# Patient Record
Sex: MALE | Race: WHITE | NOT HISPANIC OR LATINO | Employment: OTHER | URBAN - METROPOLITAN AREA
[De-identification: names, ages, dates, MRNs, and addresses within clinical notes are randomized per-mention and may not be internally consistent; named-entity substitution may affect disease eponyms.]

---

## 2017-01-06 ENCOUNTER — OFFICE VISIT (OUTPATIENT)
Dept: URGENT CARE | Facility: URGENT CARE | Age: 47
End: 2017-01-06
Payer: MEDICARE

## 2017-01-06 VITALS
DIASTOLIC BLOOD PRESSURE: 64 MMHG | TEMPERATURE: 98.3 F | HEART RATE: 80 BPM | RESPIRATION RATE: 14 BRPM | OXYGEN SATURATION: 97 % | SYSTOLIC BLOOD PRESSURE: 98 MMHG

## 2017-01-06 DIAGNOSIS — L03.031 PARONYCHIA OF SECOND TOE, RIGHT: Primary | ICD-10-CM

## 2017-01-06 PROCEDURE — 99213 OFFICE O/P EST LOW 20 MIN: CPT | Performed by: FAMILY MEDICINE

## 2017-01-06 RX ORDER — CEPHALEXIN 500 MG/1
500 CAPSULE ORAL 2 TIMES DAILY
Qty: 20 CAPSULE | Refills: 0 | Status: SHIPPED | OUTPATIENT
Start: 2017-01-06 | End: 2017-01-16

## 2017-01-06 NOTE — MR AVS SNAPSHOT
After Visit Summary   1/6/2017    Alpesh Stark    MRN: [id number]           Patient Information     Date Of Birth          1970        Visit Information        Provider Department      1/6/2017 5:15 PM Maria Way MD [facility]        Today's Diagnoses     Paronychia of second toe, right    -  1       Care Instructions      Paronychia of the Finger or Toe  Paronychia is an infection near a fingernail or toenail. It usually occurs when an opening in the cuticle or an ingrown toenail lets bacteria under the skin.  The infection will need to be drained if pus is present. If the infection has been caught early, you may need only antibiotic treatment. Healing will take about 1 to 2 weeks.  Home care  Follow these guidelines when caring for yourself at home:    Clean and soak the toe or finger. Do this twice a day for the first 3 days. To do so:    Soak your foot or hand in a tub of warm water for 5 minutes. Or hold your toe or finger under a faucet of warm running water for 5 minutes.    Clean any crust away with soap and water using a cotton swab.    Put antibiotic ointment on the infected area.    Change the dressing daily or any time it becomes soiled.    If you were given antibiotics, take them as directed until they are all gone.    If your infection is on a toe, wear comfortable shoes with a lot of toe room. You can also wear open-toed sandals while your toe heals.    You may use acetaminophen or ibuprofen to help with pain, unless another medicine was prescribed. If you have chronic liver or kidney disease, talk with your health care provider before using these medicines. Also talk with your provider if you've had a stomach ulcer or GI bleeding.  Prevention  The following can prevent paronychia:    Trim or push down the skin around the nail (cuticle).    Don't bite your nails.    Don't suck on your thumbs or fingers.  Follow-up care  Follow up with your  "health care provider, or as advised.  When to seek medical advice  Call your health care provider right away if any of these occur:    Redness, pain, or swelling of the finger or toe gets worse    Red streaks in the skin leading away from the wound    Pus or fluid drain from the nail area    Fever of 100.4 F (38 C) or higher, or as directed by your health care provider    2105-4481 [employer]. [address] [place] [address], [place] [address]. All rights reserved. This information is not intended as a substitute for professional medical care. Always follow your healthcare professional's instructions.              Follow-ups after your visit        Who to contact     If you have questions or need follow up information about today's clinic visit or your schedule please contact [facility] directly at [phone].  Normal or non-critical lab and imaging results will be communicated to you by MEDNAXhart, letter or phone within 4 business days after the clinic has received the results. If you do not hear from us within 7 days, please contact the clinic through MEDNAXhart or phone. If you have a critical or abnormal lab result, we will notify you by phone as soon as possible.  Submit refill requests through CarePayment or call your pharmacy and they will forward the refill request to us. Please allow 3 business days for your refill to be completed.          Additional Information About Your Visit        CarePayment Information     CarePayment lets you send messages to your doctor, view your test results, renew your prescriptions, schedule appointments and more. To sign up, go to [email or web address] . Click on \"Log in\" on the left side of the screen, which will take you to the Welcome page. Then click on \"Sign up Now\" on the right side of the page.     You will be asked to enter the access code listed below, as well as some personal information. Please follow the directions to create your username and " password.     Your access code is: [email or web address]  Expires: 2017  5:58 PM     Your access code will  in 90 days. If you need help or a new code, please call your [facility] clinic or [phone].        Care EveryWhere ID     This is your Care EveryWhere ID. This could be used by other organizations to access your [facility] medical records  [id number]        Your Vitals Were     Pulse Temperature Respirations Pulse Oximetry          80 98.3  F (36.8  C) (Oral) 14 97%         Blood Pressure from Last 3 Encounters:   17 98/64   16 112/72    Weight from Last 3 Encounters:   16 168 lb 12.8 oz (76.567 kg)   12 182 lb (82.555 kg)              Today, you had the following     No orders found for display         Today's Medication Changes          These changes are accurate as of: 17  5:58 PM.  If you have any questions, ask your nurse or doctor.               Start taking these medicines.        Dose/Directions    cephALEXin 500 MG capsule   Commonly known as:  KEFLEX   Used for:  Paronychia of second toe, right   Started by:  Maria Way MD        Dose:  500 mg   Take 1 capsule (500 mg) by mouth 2 times daily for 10 days   Quantity:  20 capsule   Refills:  0            Where to get your medicines      These medications were sent to [facility] - [place], [place] - [address] [facility] [address]     Phone:  [phone]    - cephALEXin 500 MG capsule             Primary Care Provider Office Phone # Fax #    Sandeep Aguayo [phone]       [facility] [address]  [facility] [address]        Thank you!     Thank you for choosing [facility]  for your care. Our goal is always to provide you with excellent care. Hearing back from our patients is one way we can continue to improve our services. Please take a few minutes to complete the written survey that you may  receive in the mail after your visit with us. Thank you!             Your Updated Medication List - Protect others around you: Learn how to safely use, store and throw away your medicines at www.disposemymeds.org.          This list is accurate as of: 1/6/17  5:58 PM.  Always use your most recent med list.                   Brand Name Dispense Instructions for use    calcium carbonate 500 MG tablet    OS-JOSÉ 500 mg [place]. [place]     Take 500 mg by mouth 2 times daily       carBAMazepine 200 MG tablet    TEGretol     Take 200 mg by mouth 3 times daily.       cephALEXin 500 MG capsule    KEFLEX    20 capsule    Take 1 capsule (500 mg) by mouth 2 times daily for 10 days       cloNIDine 0.1 MG/24HR WK patch    CATAPRES-TTS1     Place 1 patch onto the skin once a week       ferrous sulfate 325 (65 FE) MG tablet    IRON     Take by mouth daily (with breakfast)       LAMOTRIGINE PO          MIRALAX PO      Take  by mouth. 1 capful qd prn       omeprazole 20 MG CR capsule    priLOSEC     Take 20 mg by mouth 2 times daily.       omeprazole-sodium bicarbonate  MG per capsule    ZEGERID     Take 1 capsule by mouth every morning (before breakfast)       * risperDAL 0.25 MG tablet   Generic drug:  risperiDONE      Take 0.25 mg by mouth daily as needed.       * risperDAL 0.25 MG tablet   Generic drug:  risperiDONE      Take 0.75 mg by mouth 2 times daily.       vitamin C 500 MG/5ML syrup    ASCORBIC ACID     Take 500 mg by mouth daily       VITAMIN D3 PO      Take 2,000 Units by mouth daily       ZOLOFT 100 MG tablet   Generic drug:  sertraline      Take 100 mg by mouth daily.       ZONEGRAN 100 MG capsule   Generic drug:  zonisamide      Take 100 mg by mouth At Bedtime.       ZYRTEC 10 MG tablet   Generic drug:  cetirizine      Take 10 mg by mouth daily.       * Notice:  This list has 2 medication(s) that are the same as other medications prescribed for you. Read the directions carefully, and ask your doctor or other care  provider to review them with you.

## 2017-01-06 NOTE — NURSING NOTE
"Chief Complaint   Patient presents with     Urgent Care     Derm Problem     tore off hangnail on left 2nd toe 2 days ago, now infected       Initial BP 98/64 mmHg  Pulse 80  Temp(Src) 98.3  F (36.8  C) (Oral)  Resp 14  SpO2 97% Estimated body mass index is 26.43 kg/(m^2) as calculated from the following:    Height as of 11/1/16: 5' 7\" (1.702 m).    Weight as of 11/1/16: 168 lb 12.8 oz (76.567 kg).  BP completed using cuff size: regular  "

## 2017-01-06 NOTE — PATIENT INSTRUCTIONS
Paronychia of the Finger or Toe  Paronychia is an infection near a fingernail or toenail. It usually occurs when an opening in the cuticle or an ingrown toenail lets bacteria under the skin.  The infection will need to be drained if pus is present. If the infection has been caught early, you may need only antibiotic treatment. Healing will take about 1 to 2 weeks.  Home care  Follow these guidelines when caring for yourself at home:    Clean and soak the toe or finger. Do this twice a day for the first 3 days. To do so:    Soak your foot or hand in a tub of warm water for 5 minutes. Or hold your toe or finger under a faucet of warm running water for 5 minutes.    Clean any crust away with soap and water using a cotton swab.    Put antibiotic ointment on the infected area.    Change the dressing daily or any time it becomes soiled.    If you were given antibiotics, take them as directed until they are all gone.    If your infection is on a toe, wear comfortable shoes with a lot of toe room. You can also wear open-toed sandals while your toe heals.    You may use acetaminophen or ibuprofen to help with pain, unless another medicine was prescribed. If you have chronic liver or kidney disease, talk with your health care provider before using these medicines. Also talk with your provider if you've had a stomach ulcer or GI bleeding.  Prevention  The following can prevent paronychia:    Trim or push down the skin around the nail (cuticle).    Don't bite your nails.    Don't suck on your thumbs or fingers.  Follow-up care  Follow up with your health care provider, or as advised.  When to seek medical advice  Call your health care provider right away if any of these occur:    Redness, pain, or swelling of the finger or toe gets worse    Red streaks in the skin leading away from the wound    Pus or fluid drain from the nail area    Fever of 100.4 F (38 C) or higher, or as directed by your health care provider    1561-7192  [employer]. [address] [place] [address], [place] [address]. All rights reserved. This information is not intended as a substitute for professional medical care. Always follow your healthcare professional's instructions.

## 2017-01-07 NOTE — PROGRESS NOTES
SUBJECTIVE:  Alpesh Stark is a 46 year old male who c/o right 2nd toe infection after pulled at a hangnail there 2 days ago.  No pus discharge.     OBJECTIVE:  Vital signs as noted above.  Appearance: in no apparent distress.  Right 2nd toe exam: soft tissue swelling, redness and tenderness at the distal medial nail fold.  No fluctuance.  No pus discharge.     ASSESSMENT:  Right Second Toe Paronychia    PLAN:  Keflex as per orders.  Foot soaks BID.  RTC if not improving as anticipated.   See orders in [facility].  Maria Garrido MD

## 2018-05-24 ENCOUNTER — OFFICE VISIT (OUTPATIENT)
Dept: URGENT CARE | Facility: URGENT CARE | Age: 48
End: 2018-05-24
Payer: COMMERCIAL

## 2018-05-24 VITALS
HEART RATE: 74 BPM | BODY MASS INDEX: 26.94 KG/M2 | TEMPERATURE: 96.8 F | SYSTOLIC BLOOD PRESSURE: 120 MMHG | DIASTOLIC BLOOD PRESSURE: 75 MMHG | OXYGEN SATURATION: 98 % | WEIGHT: 172 LBS

## 2018-05-24 DIAGNOSIS — S05.12XA ECCHYMOSIS OF LEFT EYE: Primary | ICD-10-CM

## 2018-05-24 PROCEDURE — 99213 OFFICE O/P EST LOW 20 MIN: CPT | Performed by: STUDENT IN AN ORGANIZED HEALTH CARE EDUCATION/TRAINING PROGRAM

## 2018-05-24 NOTE — PROGRESS NOTES
Subjective:   Alpesh Stark is a 47 year old male who presents for   Chief Complaint   Patient presents with     Urgent Care     Pt in clinic to have eval for left eye redness and swelling.     Eye Problem     Patient is here with 1 of his caretakers from his group home for evaluation of left upper eyelid redness.  They wanted him to be seen as he has had a history in the recent past of left eye irritation that was initially prescribed with antibiotics as well as eyedrops.  He reports that he got better, but approximately 2 weeks after being treated for this he was then seen again and prescribed an antifungal cream.  Since that time everything has improved.  Yesterday they noticed that his upper eyelid was more bruised in appearance.  The patient himself says that he hit himself in the left eye with a broom when he was doing some chores yesterday.  He reports that it is painful.  No drainage from that eye.  No increased hearing, redness of the sclera, or headaches.  He has not reported any problems with his vision.  He does not appear to have any difficulties per staff with IV movements.  No other bruises noted on his face or head.  He has been acting his normal self today.    PMHX/PSHX/MEDS/ALLERGIES/SHX/FHX reviewed and updated in Epic.    There are no active problems to display for this patient.    Current Outpatient Prescriptions   Medication     calcium carbonate (OS-JOSÉ 500 MG [place]. [place]) 500 MG tablet     carbamazepine (TEGRETOL) 200 MG tablet     cetirizine (ZYRTEC) 10 MG tablet     Cholecalciferol (VITAMIN D3 PO)     cloNIDine (CATAPRES-TTS1) 0.1 MG/24HR patch     ferrous sulfate (IRON) 325 (65 FE) MG tablet     LAMOTRIGINE PO     omeprazole (PRILOSEC) 20 MG capsule     omeprazole-sodium bicarbonate (ZEGERID)  MG per capsule     Polyethylene Glycol 3350 (MIRALAX PO)     risperidone (RISPERDAL) 0.25 MG tablet     risperidone (RISPERDAL) 0.25 MG tablet     sertraline (ZOLOFT) 100 MG tablet     vitamin C  (ASCORBIC ACID) 500 MG/5ML syrup     zonisamide (ZONEGRAN) 100 MG capsule     No current facility-administered medications for this visit.      ROS:  As above per HPI    Objective:   /75  Pulse 74  Temp 96.8  F (36  C) (Tympanic)  Wt 172 lb (78 kg)  SpO2 98%  BMI 26.94 kg/m2, Body mass index is 26.94 kg/(m^2).  Gen:  NAD, well-nourished, sitting in chair comfortably  HEENT: EOMI and not painful, PERRL sclera anicteric and not injected, ecchymosis over the entire upper left eyelid.  No laceration or skin breakdown.  Able to fully open and close eyes on command.  Head normocephalic; nares patent; oropharynx pink and moist w/ tonsils 2+  Neck: trachea midline, supple without adenopathy    Assessment & Plan:   Alpesh Stark, 47 year old male who presents with:  Alpesh was seen today for urgent care and eye problem.    Diagnoses and all orders for this visit:    Ecchymosis of left eye    Patient likely had an acute traumatic event with the broom yesterday causing bruising of his left upper eyelid.  There does not appear to be any problems with his eyesight or extraocular motion.  The skin does not appear to be hyperkeratotic or irritated suggesting recurrent infections as he has been having in the past.  Discussed that they could place ice on his thigh if he is complaining of pain for approximately 5-10 minutes at a time.  He could use Tylenol as needed for pain if it is very bothersome for him.  Bruising should resolve in the next 1-2 weeks.  Discussed warning signs and symptoms for them to return for further evaluation.    Iesha Singh DO PGY2  [facility] [place]    Options for treatment and/or follow-up care were reviewed with the patient. Alpesh Stark and/or legal guardian was engaged and actively involved in the decision making process. Patient/guardian verbalized understanding of the options discussed and was satisfied with the final plan.

## 2018-05-24 NOTE — MR AVS SNAPSHOT
After Visit Summary   5/24/2018    Alpesh Stark    MRN: [id number]           Patient Information     Date Of Birth          1970        Visit Information        Provider Department      5/24/2018 5:45 PM Iesha Singh, DO [facility]        Today's Diagnoses     Ecchymosis of left eye    -  1       Follow-ups after your visit        Who to contact     If you have questions or need follow up information about today's clinic visit or your schedule please contact [facility] directly at [phone].  Normal or non-critical lab and imaging results will be communicated to you by MyChart, letter or phone within 4 business days after the clinic has received the results. If you do not hear from us within 7 days, please contact the clinic through MyChart or phone. If you have a critical or abnormal lab result, we will notify you by phone as soon as possible.  Submit refill requests through [employer] or call your pharmacy and they will forward the refill request to us. Please allow 3 business days for your refill to be completed.          Additional Information About Your Visit        Care EveryWhere ID     This is your Care EveryWhere ID. This could be used by other organizations to access your [facility] medical records  [id number]        Your Vitals Were     Pulse Temperature Pulse Oximetry BMI (Body Mass Index)          74 96.8  F (36  C) (Tympanic) 98% 26.94 kg/m2         Blood Pressure from Last 3 Encounters:   05/24/18 120/75   01/06/17 98/64   11/01/16 112/72    Weight from Last 3 Encounters:   05/24/18 172 lb (78 kg)   11/01/16 168 lb 12.8 oz (76.6 kg)   02/17/12 182 lb (82.6 kg)              Today, you had the following     No orders found for display       Primary Care Provider Office Phone # Fax #    Sandeep Aguayo [phone]       [facility] [address] [facility] [address]        Equal Access to  Services     [facility]: Hadii aad ku hadkeerthiluzma Yulyholley, wasegundoda luqadaha, qaybta kaalmada maria luisa, william resendez . So [facility] [phone].    ATENCIÓN: Si chanola malini, tiene a casey disposición servicios gratuitos de asistencia lingüística. Llame al [phone].    We comply with applicable federal civil rights laws and [place] laws. We do not discriminate on the basis of race, color, national origin, age, disability, sex, sexual orientation, or gender identity.            Thank you!     Thank you for choosing [facility]  for your care. Our goal is always to provide you with excellent care. Hearing back from our patients is one way we can continue to improve our services. Please take a few minutes to complete the written survey that you may receive in the mail after your visit with us. Thank you!             Your Updated Medication List - Protect others around you: Learn how to safely use, store and throw away your medicines at www.disposemymeds.org.          This list is accurate as of 5/24/18  9:52 PM.  Always use your most recent med list.                   Brand Name Dispense Instructions for use Diagnosis    calcium carbonate 500 MG tablet    OS-JOSÉ 500 mg [place]. [place]     Take 500 mg by mouth 2 times daily        carBAMazepine 200 MG tablet    TEGretol     Take 200 mg by mouth 3 times daily.        cloNIDine 0.1 MG/24HR WK patch    CATAPRES-TTS1     Place 1 patch onto the skin once a week        ferrous sulfate 325 (65 Fe) MG tablet    IRON     Take by mouth daily (with breakfast)        LAMOTRIGINE PO           MIRALAX PO      Take  by mouth. 1 capful qd prn        omeprazole 20 MG CR capsule    priLOSEC     Take 20 mg by mouth 2 times daily.        omeprazole-sodium bicarbonate  MG per capsule    ZEGERID     Take 1 capsule by mouth every morning (before breakfast)        * risperDAL 0.25 MG tablet   Generic drug:  risperiDONE      Take 0.25 mg by  mouth daily as needed.        * risperDAL 0.25 MG tablet   Generic drug:  risperiDONE      Take 0.75 mg by mouth 2 times daily.        vitamin C 500 MG/5ML syrup    ASCORBIC ACID     Take 500 mg by mouth daily        VITAMIN D3 PO      Take 2,000 Units by mouth daily        ZOLOFT 100 MG tablet   Generic drug:  sertraline      Take 100 mg by mouth daily.        ZONEGRAN 100 MG capsule   Generic drug:  zonisamide      Take 100 mg by mouth At Bedtime.        ZYRTEC 10 MG tablet   Generic drug:  cetirizine      Take 10 mg by mouth daily.        * Notice:  This list has 2 medication(s) that are the same as other medications prescribed for you. Read the directions carefully, and ask your doctor or other care provider to review them with you.

## 2018-09-04 ENCOUNTER — APPOINTMENT (OUTPATIENT)
Dept: URGENT CARE | Facility: URGENT CARE | Age: 48
End: 2018-09-04
Payer: COMMERCIAL

## 2020-04-21 ENCOUNTER — COMMUNICATION - HEALTHEAST (OUTPATIENT)
Dept: SCHEDULING | Facility: CLINIC | Age: 50
End: 2020-04-21

## 2021-06-07 NOTE — TELEPHONE ENCOUNTER
Supervisor from home where Alpesh lives calling. No ALANNAH on file. Declines triage.  No information regarding Alpesh given to staff member.   States she thinks he broke his finger, needs to bring him in, however due to his disabilities is wondering about visitors in the ED.   RN talked to [facility] staff and policy is no visitors, they can talk to the front staff when they arrive but the rule is no visitors.   Radha notified. States she will talk to his guardian and will decide what to do and Radha ended call.    Diana Gallagher, RN   [facility] RN Triage    Reason for Disposition    General information question, no triage required and triager able to answer question    Protocols used: INFORMATION ONLY CALL-A-AH

## 2021-11-01 ENCOUNTER — APPOINTMENT (OUTPATIENT)
Dept: RADIOLOGY | Facility: HOSPITAL | Age: 51
End: 2021-11-01
Attending: STUDENT IN AN ORGANIZED HEALTH CARE EDUCATION/TRAINING PROGRAM
Payer: COMMERCIAL

## 2021-11-01 ENCOUNTER — HOSPITAL ENCOUNTER (EMERGENCY)
Facility: HOSPITAL | Age: 51
Discharge: HOME | End: 2021-11-01
Attending: EMERGENCY MEDICINE | Admitting: EMERGENCY MEDICINE
Payer: COMMERCIAL

## 2021-11-01 VITALS
TEMPERATURE: 97.7 F | DIASTOLIC BLOOD PRESSURE: 87 MMHG | OXYGEN SATURATION: 99 % | HEART RATE: 83 BPM | SYSTOLIC BLOOD PRESSURE: 169 MMHG | RESPIRATION RATE: 22 BRPM

## 2021-11-01 DIAGNOSIS — S60.511A HAND ABRASION, RIGHT, INITIAL ENCOUNTER: ICD-10-CM

## 2021-11-01 DIAGNOSIS — S69.91XA HAND INJURY, RIGHT, INITIAL ENCOUNTER: ICD-10-CM

## 2021-11-01 PROCEDURE — 250N000013 HC RX MED GY IP 250 OP 250 PS 637: Performed by: EMERGENCY MEDICINE

## 2021-11-01 PROCEDURE — 99284 EMERGENCY DEPT VISIT MOD MDM: CPT

## 2021-11-01 PROCEDURE — 73130 X-RAY EXAM OF HAND: CPT | Mod: RT

## 2021-11-01 PROCEDURE — 73110 X-RAY EXAM OF WRIST: CPT | Mod: RT

## 2021-11-01 RX ORDER — ACETAMINOPHEN 325 MG/1
975 TABLET ORAL ONCE
Status: COMPLETED | OUTPATIENT
Start: 2021-11-01 | End: 2021-11-01

## 2021-11-01 RX ADMIN — ACETAMINOPHEN 975 MG: 325 TABLET ORAL at 10:46

## 2021-11-01 ASSESSMENT — ENCOUNTER SYMPTOMS
JOINT SWELLING: 0
NAUSEA: 0
ACTIVITY CHANGE: 0
CONFUSION: 0
VOMITING: 0
SHORTNESS OF BREATH: 0
ABDOMINAL PAIN: 0
DIZZINESS: 0

## 2021-11-01 NOTE — ED TRIAGE NOTES
Developmentally Disabled male with group home staff arrives to ED after punching a mirror with his right upper extremity this am; pt winces in pain when he moves his right wrist and hand.

## 2021-11-01 NOTE — DISCHARGE INSTRUCTIONS
Continue to clean wounds with antibacterial soap and water.  Follow up with a primary clinic for re-evaluation of wounds.  Return for signs of infection - increasing redness/swelling, especially 2-3 days from now.

## 2022-05-11 ENCOUNTER — APPOINTMENT (OUTPATIENT)
Dept: CT IMAGING | Facility: CLINIC | Age: 52
End: 2022-05-11
Attending: EMERGENCY MEDICINE
Payer: COMMERCIAL

## 2022-05-11 ENCOUNTER — HOSPITAL ENCOUNTER (EMERGENCY)
Facility: CLINIC | Age: 52
Discharge: HOME | End: 2022-05-11
Attending: EMERGENCY MEDICINE | Admitting: EMERGENCY MEDICINE
Payer: COMMERCIAL

## 2022-05-11 VITALS
OXYGEN SATURATION: 99 % | BODY MASS INDEX: 26.07 KG/M2 | HEIGHT: 68 IN | RESPIRATION RATE: 23 BRPM | HEART RATE: 78 BPM | DIASTOLIC BLOOD PRESSURE: 94 MMHG | SYSTOLIC BLOOD PRESSURE: 147 MMHG | TEMPERATURE: 98.2 F | WEIGHT: 172 LBS

## 2022-05-11 DIAGNOSIS — R42 DIZZINESS: ICD-10-CM

## 2022-05-11 DIAGNOSIS — E83.42 HYPOMAGNESEMIA: ICD-10-CM

## 2022-05-11 DIAGNOSIS — E87.1 HYPONATREMIA: ICD-10-CM

## 2022-05-11 LAB
ALBUMIN SERPL-MCNC: 4.5 G/DL (ref 3.5–5)
ALP SERPL-CCNC: 103 U/L (ref 45–120)
ALT SERPL W P-5'-P-CCNC: 24 U/L (ref 0–45)
ANION GAP SERPL CALCULATED.3IONS-SCNC: 10 MMOL/L (ref 5–18)
AST SERPL W P-5'-P-CCNC: 27 U/L (ref 0–40)
ATRIAL RATE - MUSE: 71 BPM
ATRIAL RATE - MUSE: 77 BPM
BASOPHILS # BLD AUTO: 0 10E3/UL (ref 0–0.2)
BASOPHILS NFR BLD AUTO: 1 %
BILIRUB SERPL-MCNC: 0.4 MG/DL (ref 0–1)
BUN SERPL-MCNC: 9 MG/DL (ref 8–22)
CALCIUM SERPL-MCNC: 9.7 MG/DL (ref 8.5–10.5)
CHLORIDE BLD-SCNC: 89 MMOL/L (ref 98–107)
CO2 SERPL-SCNC: 27 MMOL/L (ref 22–31)
CREAT SERPL-MCNC: 0.76 MG/DL (ref 0.7–1.3)
DIASTOLIC BLOOD PRESSURE - MUSE: NORMAL MMHG
DIASTOLIC BLOOD PRESSURE - MUSE: NORMAL MMHG
EOSINOPHIL # BLD AUTO: 0 10E3/UL (ref 0–0.7)
EOSINOPHIL NFR BLD AUTO: 0 %
ERYTHROCYTE [DISTWIDTH] IN BLOOD BY AUTOMATED COUNT: 11.9 % (ref 10–15)
GFR SERPL CREATININE-BSD FRML MDRD: >90 ML/MIN/1.73M2
GLUCOSE BLD-MCNC: 98 MG/DL (ref 70–125)
HCT VFR BLD AUTO: 38.2 % (ref 40–53)
HGB BLD-MCNC: 13.7 G/DL (ref 13.3–17.7)
IMM GRANULOCYTES # BLD: 0 10E3/UL
IMM GRANULOCYTES NFR BLD: 0 %
INTERPRETATION ECG - MUSE: NORMAL
INTERPRETATION ECG - MUSE: NORMAL
LYMPHOCYTES # BLD AUTO: 1 10E3/UL (ref 0.8–5.3)
LYMPHOCYTES NFR BLD AUTO: 27 %
MAGNESIUM SERPL-MCNC: 1.7 MG/DL (ref 1.8–2.6)
MCH RBC QN AUTO: 32.2 PG (ref 26.5–33)
MCHC RBC AUTO-ENTMCNC: 35.9 G/DL (ref 31.5–36.5)
MCV RBC AUTO: 90 FL (ref 78–100)
MONOCYTES # BLD AUTO: 0.5 10E3/UL (ref 0–1.3)
MONOCYTES NFR BLD AUTO: 13 %
NEUTROPHILS # BLD AUTO: 2.1 10E3/UL (ref 1.6–8.3)
NEUTROPHILS NFR BLD AUTO: 59 %
NRBC # BLD AUTO: 0 10E3/UL
NRBC BLD AUTO-RTO: 0 /100
P AXIS - MUSE: 74 DEGREES
P AXIS - MUSE: 82 DEGREES
PLATELET # BLD AUTO: 338 10E3/UL (ref 150–450)
POTASSIUM BLD-SCNC: 4.3 MMOL/L (ref 3.5–5)
PR INTERVAL - MUSE: 134 MS
PR INTERVAL - MUSE: 136 MS
PROT SERPL-MCNC: 8.1 G/DL (ref 6–8)
QRS DURATION - MUSE: 104 MS
QRS DURATION - MUSE: 106 MS
QT - MUSE: 378 MS
QT - MUSE: 382 MS
QTC - MUSE: 415 MS
QTC - MUSE: 427 MS
R AXIS - MUSE: 69 DEGREES
R AXIS - MUSE: 72 DEGREES
RBC # BLD AUTO: 4.26 10E6/UL (ref 4.4–5.9)
SODIUM SERPL-SCNC: 126 MMOL/L (ref 136–145)
SYSTOLIC BLOOD PRESSURE - MUSE: NORMAL MMHG
SYSTOLIC BLOOD PRESSURE - MUSE: NORMAL MMHG
T AXIS - MUSE: 68 DEGREES
T AXIS - MUSE: 78 DEGREES
TROPONIN I SERPL-MCNC: <0.01 NG/ML (ref 0–0.29)
VENTRICULAR RATE- MUSE: 71 BPM
VENTRICULAR RATE- MUSE: 77 BPM
WBC # BLD AUTO: 3.6 10E3/UL (ref 4–11)

## 2022-05-11 PROCEDURE — 83735 ASSAY OF MAGNESIUM: CPT | Performed by: EMERGENCY MEDICINE

## 2022-05-11 PROCEDURE — 93005 ELECTROCARDIOGRAM TRACING: CPT | Performed by: EMERGENCY MEDICINE

## 2022-05-11 PROCEDURE — 70498 CT ANGIOGRAPHY NECK: CPT

## 2022-05-11 PROCEDURE — 99285 EMERGENCY DEPT VISIT HI MDM: CPT | Mod: 25

## 2022-05-11 PROCEDURE — 250N000011 HC RX IP 250 OP 636: Performed by: EMERGENCY MEDICINE

## 2022-05-11 PROCEDURE — 258N000003 HC RX IP 258 OP 636: Performed by: EMERGENCY MEDICINE

## 2022-05-11 PROCEDURE — 70496 CT ANGIOGRAPHY HEAD: CPT

## 2022-05-11 PROCEDURE — 80053 COMPREHEN METABOLIC PANEL: CPT | Performed by: EMERGENCY MEDICINE

## 2022-05-11 PROCEDURE — 85025 COMPLETE CBC W/AUTO DIFF WBC: CPT | Performed by: EMERGENCY MEDICINE

## 2022-05-11 PROCEDURE — 84484 ASSAY OF TROPONIN QUANT: CPT | Performed by: EMERGENCY MEDICINE

## 2022-05-11 PROCEDURE — 36415 COLL VENOUS BLD VENIPUNCTURE: CPT | Performed by: EMERGENCY MEDICINE

## 2022-05-11 PROCEDURE — 250N000013 HC RX MED GY IP 250 OP 250 PS 637: Performed by: EMERGENCY MEDICINE

## 2022-05-11 PROCEDURE — 96360 HYDRATION IV INFUSION INIT: CPT | Mod: 59

## 2022-05-11 RX ORDER — MECLIZINE HYDROCHLORIDE 25 MG/1
25 TABLET ORAL ONCE
Status: COMPLETED | OUTPATIENT
Start: 2022-05-11 | End: 2022-05-11

## 2022-05-11 RX ORDER — IOPAMIDOL 755 MG/ML
100 INJECTION, SOLUTION INTRAVASCULAR ONCE
Status: COMPLETED | OUTPATIENT
Start: 2022-05-11 | End: 2022-05-11

## 2022-05-11 RX ORDER — MAGNESIUM OXIDE 400 MG/1
400 TABLET ORAL ONCE
Status: COMPLETED | OUTPATIENT
Start: 2022-05-11 | End: 2022-05-11

## 2022-05-11 RX ORDER — SODIUM CHLORIDE 9 MG/ML
INJECTION, SOLUTION INTRAVENOUS CONTINUOUS
Status: DISCONTINUED | OUTPATIENT
Start: 2022-05-11 | End: 2022-05-11 | Stop reason: HOSPADM

## 2022-05-11 RX ADMIN — Medication 400 MG: at 17:59

## 2022-05-11 RX ADMIN — SODIUM CHLORIDE 1000 ML: 9 INJECTION, SOLUTION INTRAVENOUS at 17:59

## 2022-05-11 RX ADMIN — IOPAMIDOL 75 ML: 755 INJECTION, SOLUTION INTRAVENOUS at 17:24

## 2022-05-11 RX ADMIN — MECLIZINE HYDROCHLORIDE 25 MG: 25 TABLET ORAL at 16:45

## 2022-05-11 NOTE — ED NOTES
"Expected Patient Referral to ED  3:45 PM    Referring Clinic/Provider:  [facility] [place]    Reason for referral/Clinical facts:  52y/o male with history of CP.  Patient c/o dizziness and \"thumping\" in left ear.  Neuroexam concerning, especially with EOM.  May need MRI    Recommendations provided:  Send to ED for further evaluation    Caller was informed that this institution does possess the capabilities and/or resources to provide for patient and should be transferred to our facility.    Discussed that if direct admit is sought and any hurdles are encountered, this ED would be happy to see the patient and evaluate.    Informed caller that recommendations provided are recommendations based only on the facts provided and that they responsible to accept or reject the advice, or to seek a formal in person consultation as needed and that this ED will see/treat patient should they arrive.      JOANN CORDERO DO  [facility] EMERGENCY ROOM  [address] [facility] [address]  [phone]       Joann Cordero DO  05/11/22 1547    "

## 2022-05-11 NOTE — ED TRIAGE NOTES
Patient presents to the ED with complaints of left ear discomfort and dizziness. He was sent from the [facility]. He reports symptoms started around 1400.     Triage Assessment     Row Name 05/11/22 1007       Triage Assessment (Adult)    Airway WDL WDL       Respiratory WDL    Respiratory WDL WDL       Skin Circulation/Temperature WDL    Skin Circulation/Temperature WDL WDL       Cardiac WDL    Cardiac WDL WDL       Peripheral/Neurovascular WDL    Peripheral Neurovascular WDL WDL       Cognitive/Neuro/Behavioral WDL    Cognitive/Neuro/Behavioral WDL WDL

## 2022-05-12 LAB
ATRIAL RATE - MUSE: 90 BPM
DIASTOLIC BLOOD PRESSURE - MUSE: 88 MMHG
INTERPRETATION ECG - MUSE: NORMAL
P AXIS - MUSE: 63 DEGREES
PR INTERVAL - MUSE: 146 MS
QRS DURATION - MUSE: 98 MS
QT - MUSE: 372 MS
QTC - MUSE: 455 MS
R AXIS - MUSE: 58 DEGREES
SYSTOLIC BLOOD PRESSURE - MUSE: 158 MMHG
T AXIS - MUSE: 74 DEGREES
VENTRICULAR RATE- MUSE: 90 BPM

## 2022-05-12 NOTE — DISCHARGE INSTRUCTIONS
Overall your work-up was reassuring.  Recommend close follow-up with your primary care doctor for recheck of your electrolytes.  Your sodium is chronically low and at its baseline.  Your magnesium was mildly depressed today replace that with oral supplementation.  Expect improvement to your symptoms.  If you have escalation your symptoms or develop additional concern return to emergency department for repeat assessment.

## 2023-02-07 ENCOUNTER — HOSPITAL ENCOUNTER (EMERGENCY)
Facility: CLINIC | Age: 53
Discharge: HOME | End: 2023-02-07
Attending: EMERGENCY MEDICINE | Admitting: EMERGENCY MEDICINE
Payer: COMMERCIAL

## 2023-02-07 VITALS
WEIGHT: 172 LBS | OXYGEN SATURATION: 98 % | DIASTOLIC BLOOD PRESSURE: 75 MMHG | TEMPERATURE: 97.8 F | RESPIRATION RATE: 16 BRPM | SYSTOLIC BLOOD PRESSURE: 131 MMHG | BODY MASS INDEX: 27 KG/M2 | HEART RATE: 70 BPM | HEIGHT: 67 IN

## 2023-02-07 DIAGNOSIS — T50.901A ACCIDENTAL OVERDOSE, INITIAL ENCOUNTER: ICD-10-CM

## 2023-02-07 PROCEDURE — 99282 EMERGENCY DEPT VISIT SF MDM: CPT

## 2023-02-07 ASSESSMENT — ACTIVITIES OF DAILY LIVING (ADL): ADLS_ACUITY_SCORE: 35

## 2023-02-08 ENCOUNTER — HOSPITAL ENCOUNTER (EMERGENCY)
Facility: HOSPITAL | Age: 53
Discharge: HOME | End: 2023-02-08
Attending: EMERGENCY MEDICINE | Admitting: EMERGENCY MEDICINE
Payer: COMMERCIAL

## 2023-02-08 VITALS
WEIGHT: 175 LBS | OXYGEN SATURATION: 97 % | TEMPERATURE: 98.3 F | RESPIRATION RATE: 20 BRPM | HEART RATE: 88 BPM | BODY MASS INDEX: 25.05 KG/M2 | SYSTOLIC BLOOD PRESSURE: 159 MMHG | HEIGHT: 70 IN | DIASTOLIC BLOOD PRESSURE: 98 MMHG

## 2023-02-08 DIAGNOSIS — E86.0 DEHYDRATION: ICD-10-CM

## 2023-02-08 DIAGNOSIS — R11.2 NAUSEA AND VOMITING, UNSPECIFIED VOMITING TYPE: ICD-10-CM

## 2023-02-08 LAB
ALBUMIN SERPL BCG-MCNC: 4.9 G/DL (ref 3.5–5.2)
ALP SERPL-CCNC: 113 U/L (ref 40–129)
ALT SERPL W P-5'-P-CCNC: 23 U/L (ref 10–50)
ANION GAP SERPL CALCULATED.3IONS-SCNC: 10 MMOL/L (ref 7–15)
AST SERPL W P-5'-P-CCNC: 28 U/L (ref 10–50)
BASOPHILS # BLD AUTO: 0 10E3/UL (ref 0–0.2)
BASOPHILS NFR BLD AUTO: 0 %
BILIRUB DIRECT SERPL-MCNC: <0.2 MG/DL (ref 0–0.3)
BILIRUB SERPL-MCNC: 0.4 MG/DL
BUN SERPL-MCNC: 8 MG/DL (ref 6–20)
CALCIUM SERPL-MCNC: 10.2 MG/DL (ref 8.6–10)
CHLORIDE SERPL-SCNC: 89 MMOL/L (ref 98–107)
CREAT SERPL-MCNC: 0.64 MG/DL (ref 0.67–1.17)
DEPRECATED HCO3 PLAS-SCNC: 28 MMOL/L (ref 22–29)
EOSINOPHIL # BLD AUTO: 0 10E3/UL (ref 0–0.7)
EOSINOPHIL NFR BLD AUTO: 0 %
ERYTHROCYTE [DISTWIDTH] IN BLOOD BY AUTOMATED COUNT: 12 % (ref 10–15)
GFR SERPL CREATININE-BSD FRML MDRD: >90 ML/MIN/1.73M2
GLUCOSE SERPL-MCNC: 120 MG/DL (ref 70–99)
HCT VFR BLD AUTO: 40.4 % (ref 40–53)
HGB BLD-MCNC: 14.4 G/DL (ref 13.3–17.7)
IMM GRANULOCYTES # BLD: 0 10E3/UL
IMM GRANULOCYTES NFR BLD: 0 %
LYMPHOCYTES # BLD AUTO: 0.6 10E3/UL (ref 0.8–5.3)
LYMPHOCYTES NFR BLD AUTO: 10 %
MCH RBC QN AUTO: 33 PG (ref 26.5–33)
MCHC RBC AUTO-ENTMCNC: 35.6 G/DL (ref 31.5–36.5)
MCV RBC AUTO: 92 FL (ref 78–100)
MONOCYTES # BLD AUTO: 0.3 10E3/UL (ref 0–1.3)
MONOCYTES NFR BLD AUTO: 5 %
NEUTROPHILS # BLD AUTO: 4.8 10E3/UL (ref 1.6–8.3)
NEUTROPHILS NFR BLD AUTO: 85 %
NRBC # BLD AUTO: 0 10E3/UL
NRBC BLD AUTO-RTO: 0 /100
PLATELET # BLD AUTO: 374 10E3/UL (ref 150–450)
POTASSIUM SERPL-SCNC: 4 MMOL/L (ref 3.4–5.3)
PROT SERPL-MCNC: 8.2 G/DL (ref 6.4–8.3)
RBC # BLD AUTO: 4.37 10E6/UL (ref 4.4–5.9)
SODIUM SERPL-SCNC: 127 MMOL/L (ref 136–145)
WBC # BLD AUTO: 5.7 10E3/UL (ref 4–11)

## 2023-02-08 PROCEDURE — 250N000011 HC RX IP 250 OP 636: Performed by: STUDENT IN AN ORGANIZED HEALTH CARE EDUCATION/TRAINING PROGRAM

## 2023-02-08 PROCEDURE — 82248 BILIRUBIN DIRECT: CPT | Performed by: STUDENT IN AN ORGANIZED HEALTH CARE EDUCATION/TRAINING PROGRAM

## 2023-02-08 PROCEDURE — 258N000003 HC RX IP 258 OP 636: Performed by: STUDENT IN AN ORGANIZED HEALTH CARE EDUCATION/TRAINING PROGRAM

## 2023-02-08 PROCEDURE — 96361 HYDRATE IV INFUSION ADD-ON: CPT

## 2023-02-08 PROCEDURE — 82248 BILIRUBIN DIRECT: CPT | Performed by: EMERGENCY MEDICINE

## 2023-02-08 PROCEDURE — 80053 COMPREHEN METABOLIC PANEL: CPT | Performed by: STUDENT IN AN ORGANIZED HEALTH CARE EDUCATION/TRAINING PROGRAM

## 2023-02-08 PROCEDURE — 85004 AUTOMATED DIFF WBC COUNT: CPT | Performed by: STUDENT IN AN ORGANIZED HEALTH CARE EDUCATION/TRAINING PROGRAM

## 2023-02-08 PROCEDURE — 99284 EMERGENCY DEPT VISIT MOD MDM: CPT | Mod: CS,25

## 2023-02-08 PROCEDURE — C9803 HOPD COVID-19 SPEC COLLECT: HCPCS

## 2023-02-08 PROCEDURE — 85025 COMPLETE CBC W/AUTO DIFF WBC: CPT | Performed by: EMERGENCY MEDICINE

## 2023-02-08 PROCEDURE — 80053 COMPREHEN METABOLIC PANEL: CPT | Performed by: EMERGENCY MEDICINE

## 2023-02-08 PROCEDURE — 36415 COLL VENOUS BLD VENIPUNCTURE: CPT | Performed by: STUDENT IN AN ORGANIZED HEALTH CARE EDUCATION/TRAINING PROGRAM

## 2023-02-08 PROCEDURE — 96374 THER/PROPH/DIAG INJ IV PUSH: CPT

## 2023-02-08 RX ORDER — ONDANSETRON 2 MG/ML
4 INJECTION INTRAMUSCULAR; INTRAVENOUS
Status: COMPLETED | OUTPATIENT
Start: 2023-02-08 | End: 2023-02-08

## 2023-02-08 RX ADMIN — ONDANSETRON 4 MG: 2 INJECTION INTRAMUSCULAR; INTRAVENOUS at 13:17

## 2023-02-08 RX ADMIN — SODIUM CHLORIDE 500 ML: 9 INJECTION, SOLUTION INTRAVENOUS at 13:18

## 2023-02-08 NOTE — DISCHARGE INSTRUCTIONS
Alpesh Stark was seen in the ER today after accidentally taking double his evening medications.     You can resume his normal doses of all medications tomorrow.     You should bring Alpesh Stark back to the ER if they have any vomiting, seizure, or any other new concerns otherwise please follow up in primary clinic as needed for recheck.     Below is some information you might find useful.     Thank you for choosing [facility]. It was a pleasure taking care of Alpesh Stark today!  - Dr. Tiffanie Tavarez

## 2023-02-08 NOTE — DISCHARGE INSTRUCTIONS
You were seen in the Emergency Department today for evaluation of vomiting and abdominal pain.  Your lab work showed no cause of your symptoms.  Follow up with your primary care physician to ensure resolution of symptoms. Return if you have new or worsening symptoms.

## 2023-02-08 NOTE — ED PROVIDER NOTES
EMERGENCY DEPARTMENT ENCOUNTER      NAME: Alpesh Stark  AGE: 52 year old male  YOB: 1970  MRN: [id number]  EVALUATION DATE & TIME: No admission date for patient encounter.    PCP: Sandeep Aguayo    ED PROVIDER: Lacie Chen M.D.      Chief Complaint   Patient presents with     Vomiting         FINAL IMPRESSION:  1. Nausea and vomiting, unspecified vomiting type    2. Dehydration      ED COURSE & MEDICAL DECISION MAKING:    Pertinent Labs & Imaging studies reviewed. (See chart for details)  ED Course as of 02/08/23 2123 Wed Feb 08, 2023   1501 I called Michelle, the patient's mom about him. She reports that the group home staff gave him a double dose of medications last night. She reports they just gave him his morning dose and they recommended he come in if he vomited and he did vomit today.    1515 Patient is a pleasant 52-year-old male who comes in today for evaluation of some vomiting earlier this morning.  His mom helps give some of the history.  He initially vomited at 8 AM and then again at 930.  He is feeling better now.  He was really dizzy after he vomited and he had accidentally been given an extra dose of medications yesterday so mom became concerned and wanted him brought in for evaluation.  He was treated in the waiting room with some Zofran and some IV fluids.  Basic labs were obtained.  Calcium was slightly elevated and sodium was slightly low but nothing that looked significant that would require admission to the hospital or further work-up at this time.  Patient was feeling much better and was comfortable with discharge home so we will get him discharged home.   1530 Patient is feeling much better       Medical Decision Making    History:    Supplemental history from: Mental Health    External Record(s) reviewed: Documented in chart, if applicable.    Work Up:    Chart documentation includes differential considered and any EKGs or imaging independently interpreted by provider,  where specified.    In additional to work up documented, I considered the following work up: Documented in chart, if applicable.    External consultation:    Discussion of management with another provider: Documented in chart, if applicable    Complicating factors:    Care impacted by chronic illness: Mental Health    Care affected by social determinants of health: Access to Medical Care    Disposition considerations: Discharge. No recommendations on prescription strength medication(s). I considered admission, but discharged patient after significant clinical improvement.    At the conclusion of the encounter I discussed  the results of all of the tests and the disposition with patient.   All questions were answered.  The patient acknowledged understanding and was involved in the decision making regarding the overall care plan.      I discussed with patient the utility, limitations and findings of the exam/interventions/studies done during this visit as well as the list of differential diagnosis and symptoms to monitor/return to ER for.  Additional verbal discharge instructions were provided.     MEDICATIONS GIVEN IN THE EMERGENCY:  Medications   ondansetron (ZOFRAN) injection 4 mg (4 mg Intravenous Given 2/8/23 5267)   0.9% sodium chloride BOLUS (0 mLs Intravenous Stopped 2/8/23 1422)       NEW PRESCRIPTIONS STARTED AT TODAY'S ER VISIT  Discharge Medication List as of 2/8/2023  4:22 PM             =================================================================    HPI    Triage Note:   Pt vomiting today and has headache.  Pt seen in past day for group home staff giving evening meds 2 X.  Returning due to abd pain and vomiting, dizziness.     Triage Assessment     Row Name 02/08/23 1301       Triage Assessment (Adult)    Airway WDL WDL       Respiratory WDL    Respiratory WDL WDL       Skin Circulation/Temperature WDL    Skin Circulation/Temperature WDL WDL       Cardiac WDL    Cardiac WDL WDL        Peripheral/Neurovascular WDL    Peripheral Neurovascular WDL WDL       Cognitive/Neuro/Behavioral WDL    Cognitive/Neuro/Behavioral WDL X  pt from group home. pt reports headache    Level of Consciousness alert    Orientation person       Fátima Coma Scale    Best Eye Response 4-->(E4) spontaneous    Best Motor Response 6-->(M6) obeys commands    Best Verbal Response 5-->(V5) oriented    Fátima Coma Scale Score 15                  Patient information was obtained from: mom and patient    Use of : N/A       Alpesh Stark is a 52 year old male who presents for evaluation of some vomiting.  Initially it started at 8 AM.  It got worse at 930.  He had accidentally gotten an extra dose of his medications yesterday and was seen last night.  Poison control was called and was not concerned about the overdose.  They did tell him if he vomited to come back and that is what worried mom and made him come back here.  He initially complained of some abdominal pain although that was resolved by the time I evaluated him.  He denies any fevers or chills.  He was complaining of some dizziness as well.    PAST MEDICAL HISTORY:  Past Medical History:   Diagnosis Date     Cerebral palsy (H)      Intermittent explosive disorder      Moderate developmental delay      Scoliosis      Seizure disorder (H)        PAST SURGICAL HISTORY:  Past Surgical History:   Procedure Laterality Date     AS SPINAL FUSION,ANT,EA ADNL LEVEL         CURRENT MEDICATIONS:    No current facility-administered medications for this encounter.    Current Outpatient Medications:      calcium carbonate (OS-JOSÉ 500 MG [place]. CA) 500 MG tablet, Take 500 mg by mouth 2 times daily, Disp: , Rfl:      carbamazepine (TEGRETOL) 200 MG tablet, Take 200 mg by mouth 3 times daily., Disp: , Rfl:      cetirizine (ZYRTEC) 10 MG tablet, Take 10 mg by mouth daily., Disp: , Rfl:      Cholecalciferol (VITAMIN D3 PO), Take 2,000 Units by mouth daily, Disp: , Rfl:       "cloNIDine (CATAPRES-TTS1) 0.1 MG/24HR patch, Place 1 patch onto the skin once a week, Disp: , Rfl:      ferrous sulfate (IRON) 325 (65 FE) MG tablet, Take by mouth daily (with breakfast), Disp: , Rfl:      LAMOTRIGINE PO, , Disp: , Rfl:      omeprazole (PRILOSEC) 20 MG capsule, Take 20 mg by mouth 2 times daily., Disp: , Rfl:      omeprazole-sodium bicarbonate (ZEGERID)  MG per capsule, Take 1 capsule by mouth every morning (before breakfast), Disp: , Rfl:      Polyethylene Glycol 3350 (MIRALAX PO), Take  by mouth. 1 capful qd prn, Disp: , Rfl:      risperidone (RISPERDAL) 0.25 MG tablet, Take 0.25 mg by mouth daily as needed., Disp: , Rfl:      risperidone (RISPERDAL) 0.25 MG tablet, Take 0.75 mg by mouth 2 times daily., Disp: , Rfl:      sertraline (ZOLOFT) 100 MG tablet, Take 100 mg by mouth daily., Disp: , Rfl:      vitamin C (ASCORBIC ACID) 500 MG/5ML syrup, Take 500 mg by mouth daily, Disp: , Rfl:      zonisamide (ZONEGRAN) 100 MG capsule, Take 100 mg by mouth At Bedtime., Disp: , Rfl:     ALLERGIES:  Allergies   Allergen Reactions     Zyprexa [Olanzapine]        FAMILY HISTORY:  No family history on file.    SOCIAL HISTORY:   Social History     Socioeconomic History     Marital status: Single   Tobacco Use     Smoking status: Never     Smokeless tobacco: Never       PHYSICAL EXAM    VITAL SIGNS: BP (!) 159/98   Pulse 88   Temp 98.3  F (36.8  C) (Temporal)   Resp 20   Ht 1.778 m (5' 10\")   Wt 79.4 kg (175 lb)   SpO2 97%   BMI 25.11 kg/m     GENERAL: Awake, alert, answering some questions, ambulatory  SPEECH:  Answers basic questions that I can understand  PULMONARY: No respiratory distress, Lungs clear to auscultation bilaterally  CARDIOVASCULAR: Regular rate and rhythm, Distal pulses present and normal.  ABDOMINAL: Soft, Nondistended, Nontender, No rebound or guarding, No palpable masses  EXTREMITIES: No lower extremity edema.  PSYCH: Normal mood and affect     LAB:  All pertinent labs reviewed " and interpreted.  Results for orders placed or performed during the hospital encounter of 02/08/23   Basic metabolic panel   Result Value Ref Range    Sodium 127 (L) 136 - 145 mmol/L    Potassium 4.0 3.4 - 5.3 mmol/L    Chloride 89 (L) 98 - 107 mmol/L    Carbon Dioxide (CO2) 28 22 - 29 mmol/L    Anion Gap 10 7 - 15 mmol/L    Urea Nitrogen 8.0 6.0 - 20.0 mg/dL    Creatinine 0.64 (L) 0.67 - 1.17 mg/dL    Calcium 10.2 (H) 8.6 - 10.0 mg/dL    Glucose 120 (H) 70 - 99 mg/dL    GFR Estimate >90 >60 mL/min/1.73m2   Hepatic function panel   Result Value Ref Range    Protein Total 8.2 6.4 - 8.3 g/dL    Albumin 4.9 3.5 - 5.2 g/dL    Bilirubin Total 0.4 <=1.2 mg/dL    Alkaline Phosphatase 113 40 - 129 U/L    AST 28 10 - 50 U/L    ALT 23 10 - 50 U/L    Bilirubin Direct <0.20 0.00 - 0.30 mg/dL   CBC with platelets and differential   Result Value Ref Range    WBC Count 5.7 4.0 - 11.0 10e3/uL    RBC Count 4.37 (L) 4.40 - 5.90 10e6/uL    Hemoglobin 14.4 13.3 - 17.7 g/dL    Hematocrit 40.4 40.0 - 53.0 %    MCV 92 78 - 100 fL    MCH 33.0 26.5 - 33.0 pg    MCHC 35.6 31.5 - 36.5 g/dL    RDW 12.0 10.0 - 15.0 %    Platelet Count 374 150 - 450 10e3/uL    % Neutrophils 85 %    % Lymphocytes 10 %    % Monocytes 5 %    % Eosinophils 0 %    % Basophils 0 %    % Immature Granulocytes 0 %    NRBCs per 100 WBC 0 <1 /100    Absolute Neutrophils 4.8 1.6 - 8.3 10e3/uL    Absolute Lymphocytes 0.6 (L) 0.8 - 5.3 10e3/uL    Absolute Monocytes 0.3 0.0 - 1.3 10e3/uL    Absolute Eosinophils 0.0 0.0 - 0.7 10e3/uL    Absolute Basophils 0.0 0.0 - 0.2 10e3/uL    Absolute Immature Granulocytes 0.0 <=0.4 10e3/uL    Absolute NRBCs 0.0 10e3/uL       I, Sukhdeep Emery, am serving as a scribe to document services personally performed by Dr. Chen based on my observation and the provider's statements to me. I, Lacie Chen MD attest that Sukhdeep Emery is acting in a scribe capacity, has observed my performance of the services and has documented them in accordance with my  direction.    Lacie Chen M.D.  Emergency Medicine  [facility] EMERGENCY DEPARTMENT  [facility] [facility] [id number] [phone]  Dept: [phone]     Lacie Chen MD  02/08/23 6299

## 2023-02-08 NOTE — ED PROVIDER NOTES
EMERGENCY DEPARTMENT ENCOUNTER      NAME: Alpesh Stark  YOB: 1970  MRN: [id number]  EVALUATION DATE & TIME: No admission date for patient encounter.    FINAL IMPRESSION  1. Accidental overdose, initial encounter        MEDICAL DECISION MAKING   Pertinent Labs & Imaging studies reviewed. (See chart for details)    Alpesh Stark is a 52 year old male who presents for evaluation after an accidental overdose of routine nightly medications. Patient's caregiver reports that the provider who was with patient during the day gave his evening medications (lamotrigine, guanfacine, clonidine, and carbamazepine) at 1900 before she left for the day in an attempt to be helpful. When his evening/night caregiver arrived, she was not aware they had been given and so administered medications at his usual time of 2000. After, she noticed that it was documented patient had already been given his usual doses. She spoke with nursing supervisor who advised patient be seen in the ED. Caregiver reports that after she told the patient he would need to be seen and why, he started to complain of back pain and dizziness. He does have chronic back pain and notes that this is not a new complaint. He was not reporting any dizziness until informed he would need to come to the ER and she has not observed any change in his behavior or activity since he received the second dose of meds. He has not had any episodes of emesis or other new complaints and has not had any recent s/s of illness.    I considered a broad differential including toxidrome, medication side effect/toxicity, electrolyte derangement, psychiatric/behavioral. I discussed the case and medications with pharmacist at poison control who agreed that at this point, there is very low risk for toxicity related to the doubled dose of these medications. At most, patient may feel a bit sleepy but pharmacist states that had the care staff called from home, they would likely not  have advised he be seen in the ER for the accidental overdose alone as it is considered fairly low risk. Pharmacist also recommended patient resume his usual doses tomorrow as planned. We have agreed there are no indications for labs, ECG, or antidote.     I rechecked the patient and reviewed these recommendations with his caregiver. She felt comfortable with plan for discharge and reassured by recommendations from myself and poison control. She will communicate these with nursing supervisor as well.     I instructed Alpesh's caregiver to arrange follow-up with his primary care provider as needed. We discussed warning signs and symptoms, and I instructed her to return Alpesh to the emergency department if he develops any new or worsening symptoms. She expressed understanding and agreement with this plan of care, all questions were answered, and Alpesh was discharged from the emergency department in stable condition.       Medical Decision Making    History:    Supplemental history from: Documented in chart, if applicable and Caregiver    External Record(s) reviewed: Documented in chart, if applicable.    Work Up:    Chart documentation includes differential considered and any EKGs or imaging independently interpreted by provider, where specified.    In additional to work up documented, I considered the following work up: Documented in chart, if applicable.    External consultation:    Discussion of management with another provider: Documented in chart, if applicable and Other: Poison Control    Complicating factors:    Care impacted by chronic illness: Mental Health    Care affected by social determinants of health: N/A    Disposition considerations: Discharge. No recommendations on prescription strength medication(s). See documentation for any additional details.        ED COURSE  10:21 PM I met with the patient to gather history and to perform my initial exam. We discussed plans for the ED course, including  diagnostic testing and treatment. PPE: surgical mask  10:36 PM I spoke to poison control, who said the patient can go home.   10:44 PM I rechecked the patient and updated him and his caregiver. We discussed the plan for discharge and the patient is agreeable. Reviewed supportive cares, symptomatic treatment, outpatient follow up, and reasons to return to the Emergency Department. Patient to be discharged by ED RN.       MEDICATIONS GIVEN IN THE ED  Medications - No data to display    NEW PRESCRIPTIONS STARTED AT TODAY'S VISIT  Discharge Medication List as of 2/7/2023 11:03 PM             =================================================================    Chief Complaint   Patient presents with     Medication Problem         HPI:    Patient information was obtained from: patient and care giver    Use of : N/A     Alpesh Stark is a 52 year old male who presents with dizziness.     Per patient's care giver, the patient was accidentally given his night time medications twice today, once at 7 PM and once at 8 PM. His nighttime medications include Lamotrigine 300 mg, Guanfacine 2 mg, Clonidine 0.1 mg, and Carbamazepine 300 mg. She spoke to a nurse and was told to bring the patient in to be evaluated. She notes the patient started complaining of dizziness after she told him they have to go to the ED. She states the patient has been acting normally while waiting in the ED. Patient has chronic back pain and a jhonathan in his back, but she is unsure if he is on pain medication for it. Patient has not been sick lately.     Patient reports feeling dizzy. He also endorses back pain. He denies any vision changes or difficulty breathing. No other complaints or concerns expressed at this time.          RELEVANT HISTORY, MEDICATIONS, & ALLERGIES   Past medical history, surgical history, family history, medications, and allergies reviewed and pertinent noted in HPI. See end of note for comprehensive list. See HPI.    REVIEW  "OF SYSTEMS:  See HPI.    PHYSICAL EXAM:    Vitals: /75   Pulse 70   Temp 97.8  F (36.6  C) (Oral)   Resp 16   Ht 1.702 m (5' 7\")   Wt 78 kg (172 lb)   SpO2 98%   BMI 26.94 kg/m     General: Alert and interactive, comfortable appearing.  HENT: Atraumatic. Full AROM of neck.  Eyes: Pupils mid-sized and equally reactive.   Cardiovascular: Regular rate and rhythm.   Chest/Pulmonary: Normal work of breathing. Speaking in complete sentences.   Extremities: Normal AROM of all major joints. No lower extremity edema.   Skin: Warm and dry. Normal skin color.   Neuro: Speech slightly slurred (baseline). CNs grossly intact. Moves all extremities spontaneously. Ambulatory with unsteady/limping gait (baselien).  Psych: Cooperative      I, Zuleyma Lares, am serving as a scribe to document services personally performed by Dr. Tiffanie Tavarez based on my observation and the provider's statements to me. I, Tiffanie Tavarez MD attest that Zuleyma Lares is acting in a scribe capacity, has observed my performance of the services and has documented them in accordance with my direction.    Tiffanie Tavarez M.D.  Emergency Medicine  [facility] EMERGENCY ROOM  [address] [facility] [facility] [address] [phone]  Dept: [phone]     Tiffanie Tavarez MD  02/08/23 0950    "

## 2023-02-08 NOTE — ED TRIAGE NOTES
Pt was given his evening meds twice by mistake. He received each of the following doses twice:    Lamotrigine 300mg  Guanfacine 2mg  Clonidine 0.1mg  Carbamazepine 300mg    Supervising nurse sent patient with caregiver for eval. Pt had no complaints prior to arrival. States he is a little dizzy in triage.      Triage Assessment     Row Name 02/07/23 1296       Triage Assessment (Adult)    Airway WDL WDL       Respiratory WDL    Respiratory WDL WDL       Skin Circulation/Temperature WDL    Skin Circulation/Temperature WDL WDL       Cardiac WDL    Cardiac WDL WDL       Peripheral/Neurovascular WDL    Peripheral Neurovascular WDL WDL       Cognitive/Neuro/Behavioral WDL    Cognitive/Neuro/Behavioral WDL WDL

## 2023-02-08 NOTE — ED TRIAGE NOTES
Pt vomiting today and has headache.  Pt seen in past day for group home staff giving evening meds 2 X.  Returning due to abd pain and vomiting, dizziness.     Triage Assessment     Row Name 02/08/23 1301       Triage Assessment (Adult)    Airway WDL WDL       Respiratory WDL    Respiratory WDL WDL       Skin Circulation/Temperature WDL    Skin Circulation/Temperature WDL WDL       Cardiac WDL    Cardiac WDL WDL       Peripheral/Neurovascular WDL    Peripheral Neurovascular WDL WDL       Cognitive/Neuro/Behavioral WDL    Cognitive/Neuro/Behavioral WDL X  pt from group home. pt reports headache    Level of Consciousness alert    Orientation person       [place] Coma Scale    Best Eye Response 4-->(E4) spontaneous    Best Motor Response 6-->(M6) obeys commands    Best Verbal Response 5-->(V5) oriented    Fátima Coma Scale Score 15
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
## 2024-05-08 ENCOUNTER — MEDICAL CORRESPONDENCE (OUTPATIENT)
Dept: HEALTH INFORMATION MANAGEMENT | Facility: CLINIC | Age: 54
End: 2024-05-08

## 2024-05-08 ENCOUNTER — HOSPITAL ENCOUNTER (EMERGENCY)
Facility: HOSPITAL | Age: 54
Discharge: HOME | End: 2024-05-08
Attending: EMERGENCY MEDICINE | Admitting: EMERGENCY MEDICINE
Payer: COMMERCIAL

## 2024-05-08 VITALS
HEART RATE: 77 BPM | RESPIRATION RATE: 19 BRPM | DIASTOLIC BLOOD PRESSURE: 87 MMHG | OXYGEN SATURATION: 94 % | SYSTOLIC BLOOD PRESSURE: 147 MMHG

## 2024-05-08 DIAGNOSIS — S60.416A ABRASION OF RIGHT LITTLE FINGER, INITIAL ENCOUNTER: ICD-10-CM

## 2024-05-08 PROCEDURE — 99283 EMERGENCY DEPT VISIT LOW MDM: CPT

## 2024-05-08 ASSESSMENT — ACTIVITIES OF DAILY LIVING (ADL): ADLS_ACUITY_SCORE: 35
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
## 2024-05-09 NOTE — DISCHARGE INSTRUCTIONS
Gently clean wound with soap/water daily.  Ok to dress with simple bandage. Follow up with primary clinic as needed.  No further intervention needed.
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
## 2025-01-18 ENCOUNTER — OFFICE VISIT (OUTPATIENT)
Dept: URGENT CARE | Facility: URGENT CARE | Age: 55
End: 2025-01-18
Payer: COMMERCIAL

## 2025-01-18 VITALS
RESPIRATION RATE: 20 BRPM | DIASTOLIC BLOOD PRESSURE: 80 MMHG | SYSTOLIC BLOOD PRESSURE: 120 MMHG | OXYGEN SATURATION: 99 % | HEART RATE: 74 BPM

## 2025-01-18 DIAGNOSIS — S89.91XA INJURY OF RIGHT KNEE, INITIAL ENCOUNTER: ICD-10-CM

## 2025-01-18 DIAGNOSIS — S09.90XA INJURY OF HEAD, INITIAL ENCOUNTER: Primary | ICD-10-CM

## 2025-01-18 PROCEDURE — 99204 OFFICE O/P NEW MOD 45 MIN: CPT | Performed by: NURSE PRACTITIONER

## 2025-01-18 ASSESSMENT — PAIN SCALES - GENERAL: PAINLEVEL_OUTOF10: MODERATE PAIN (6)

## 2025-01-18 NOTE — PATIENT INSTRUCTIONS
Go to emergency room for further evaluation after falling on ice last night hitting right knee and right forehead

## 2025-01-18 NOTE — PROGRESS NOTES
Assessment & Plan     Injury of head, initial encounter      Injury of right knee, initial encounter       Recommend further evaluation in emergency room for head injury with tender hematoma with severe knee pain as higher level of care indicated. Patient and staff agreeable and declines ambulance and is discharged in stable condition.         Jocelyne Hagen NP  [facility]RG[facility] ANDARLETTE Betts is a 54 year old male who presents to clinic today with his staff for the following health issues:  Chief Complaint   Patient presents with    Urgent Care    Knee Pain     Right knee pain     Laceration     Small cut on right eyebrow          1/18/2025    11:20 AM   Additional Questions   Roomed by ca   Accompanied by self       MS Injury/Pain    Onset of symptoms was 1 day(s) ago.  Location: right knee  Context:  The injury happened while falling on ice yesterday evening - unsure how he landed  Course of symptoms is worsening.    Severity severe  Current and Associated symptoms: Pain, Swelling, Bruising, Redness, and Decreased range of motion  Denies  Warmth  Aggravating Factors: walking, weight-bearing, and movement  Therapies to improve symptoms include: none    Head Injury    Onset of symptoms was 1 day(s) ago.  Mechanism of Injury: Fall on ice yesterday, unsure how landed  Loss of consciousness: Does not think so  Course of illness is worsening.    Severity moderate  Current and Associated symptoms: Tenderness, Swelling with abrasion  Denies Nausea, Confusion, Vomiting, blurred vision  Treatment measures tried include: None    He has a history of cerebral palsy, seizure disorder, developmental delay.     Problem list, Medication list, Allergies, and Medical history reviewed in EPIC.    ROS:  Review of systems negative except for noted above        Objective    /80   Pulse 74   Resp 20   SpO2 99%   Physical Exam  Constitutional:       General: He is not in acute distress.      Appearance: He is not toxic-appearing or diaphoretic.   HENT:      Head:      Comments: Hematoma right forehead tenderness with palpation appox 3 cm with abrasion oozing     Mouth/Throat:      Comments: Tongue midline  Eyes:      Extraocular Movements: Extraocular movements intact.      Pupils: Pupils are equal, round, and reactive to light.   Musculoskeletal:      Right knee: Swelling and bony tenderness present. Decreased range of motion.      Left knee: Normal.      Comments: Moderate swelling right knee with tenderness with palpation throughout right knee   Skin:     Findings: Bruising present.      Comments: Bruising right knee   Neurological:      Mental Status: He is alert.      Gait: Gait abnormal.      Comments: Walking with a limp favoring right leg